# Patient Record
Sex: FEMALE | Employment: FULL TIME | URBAN - METROPOLITAN AREA
[De-identification: names, ages, dates, MRNs, and addresses within clinical notes are randomized per-mention and may not be internally consistent; named-entity substitution may affect disease eponyms.]

---

## 2021-12-22 ENCOUNTER — HOSPITAL ENCOUNTER (OUTPATIENT)
Dept: PHYSICAL THERAPY | Age: 21
Discharge: HOME | End: 2021-12-22
Payer: MEDICAID

## 2021-12-22 PROCEDURE — 97162 PT EVAL MOD COMPLEX 30 MIN: CPT

## 2021-12-22 NOTE — PROGRESS NOTES
[facility]  [address] [place], [address] [place]  Ph [phone]  Fx [phone]    Plan of Care/ Statement of Necessity for Physical Therapy Services    Patient name: Pro Start of Care: 2021   Referral source: Sabrina Russell MD : 2000    Medical Diagnosis: Stress incontinence (female) (male) [N39.3]   Onset Date:18    Treatment Diagnosis: stress incontinence                                      ICD-10: N39.3   Prior Hospitalization: see medical history Provider#: [id number]   Medications: Verified on Patient summary List    Comorbidities: 4 pregnancies/ 1 vaginal, 2 abortions, and currently due 22, asthma, UTI   Prior Level of Function: active lifestyle, no urinary incontinence      The Plan of Care and following information is based on the information from the initial evaluation. Assessment/ key information: 4 pregnancies/ 1 vaginal, 2 abortions, and currently due 22, asthma, UTI    Evaluation Complexity History MEDIUM  Complexity : 1-2 comorbidities / personal factors will impact the outcome/ POC ; Examination HIGH Complexity : 4+ Standardized tests and measures addressing body structure, function, activity limitation and / or participation in recreation  ;Presentation MEDIUM Complexity : Evolving with changing characteristics  ; Clinical Decision Making MEDIUM Complexity : FOTO score of 26-74 : FOTO score = an established functional score where 100 = no disability  Overall Complexity Rating: MEDIUM    Problem List: decrease strength, decrease ADL/ functional abilitiies and other urinary incontinence   Treatment Plan may include any combination of the following: Therapeutic exercise, Therapeutic activities, Neuromuscular re-education, Physical agent/modality, Gait/balance training, Manual therapy, Patient education, Self Care training, Functional mobility training and Home safety training  Vasopnuematic compression justification:  Per bilateral girth measures taken and listed above the edema is considered significant and having an impact on the patient's strength and balance  Patient / Family readiness to learn indicated by: asking questions, trying to perform skills and interest  Persons(s) to be included in education: patient (P)  Barriers to Learning/Limitations: None  Measures taken if barriers to learning: none  Patient Goal (s): I don't want to pee on myself anymore\"  Patient Self Reported Health Status: good  Rehabilitation Potential: excellent  Short term goals: To be achieved in 6 weeks:  Patient will demonstrate proper dietary/fluid habits and urge suppression strategies that promote bladder and bowel health to aid in management of urinary urgency and incontinence. Status at eval: Patient consuming limited fluids and unaware of bladder fitness, dietary irritants and urge suppression strategies.      Patient will report improvement in UI complaints evidence by a decrease in pad usage and/or amount of leakage by 50% to improve QOL. Status at eval: Patient does not use pads but does change her clothes 2-3 per day, generally drops to damp (amount of leakage).          Long term goals: To be achieved in 12 weeks:  Patient will report bladder continence 75% of the time with cough/sneeze/laugh and walking to the toilet.   Status at eval: patient stress and urgency incontinence 2-3 times  per day     Patient will demonstrate improvement of current complaints evidenced by a 12 point  improvement in FOTO, Pelvic functional disability index score  Status at Eval: Pelvic functional disability index 50     Patient will demonstrate independence with management tools and exercise program that are beneficial for current condition in order to feel comfortable with Pelvic floor PT D/C and not fear exacerbation of current condition or symptoms returning.    Status at eval : patient fearful of return to exercise and unaware of what activities to avoid to avoid exacerbation of current condition       Frequency / Duration: Patient to be seen 1 times per week for 12 weeks. Patient/ Caregiver education and instruction: Diagnosis, prognosis, self care   [x]  Plan of care has been reviewed with PTA    Certification Period: estefany    Estee Zach, PT 12/22/2021 2:30 PM    ________________________________________________________________________    I certify that the above Therapy Services are being furnished while the patient is under my care. I agree with the treatment plan and certify that this therapy is necessary.     Physician's Signature:_____________________Date:____________TIME:________                                      Bertin Fraga MD      ** Signature, Date and Time must be completed for valid certification **  Please sign and return to [facility]  [address], [address] [place]  Ph [phone]  Fx [phone]

## 2021-12-22 NOTE — PROGRESS NOTES
Physical Therapy Evaluation        Patient Name: Iliana Blum  Date:2021  : 2000  [x]  Patient  Verified  Payor: BLUE CROSS MEDICAID / Plan: [facility] HEALTHKEEPERS PLUS / Product Type: Managed Care Medicaid /    In time:116  Out time:156  Total Treatment Time (min): 40  Visit #: 1 of 12    Medicare/BCBS Only   Total Timed Codes (min):  0 1:1 Treatment Time:  40       Treatment Area: Stress incontinence (female) (male) [N39.3]    SUBJECTIVE  Pain Level (0-10 scale): 0/10  []constant []intermittent []improving []worsening []no change since onset    Any medication changes, allergies to medications, adverse drug reactions, diagnosis change, or new procedure performed?: [x] No    [] Yes (see summary sheet for update)  Subjective functional status/changes:     PLOF: active lifestyle   Limitations to PLOF: \"pees all the time\"   Mechanism of Injury:  2018  Current symptoms/Complaints: leaks with sneeze, cough, laugh, lifting, transfers, urgency  Previous Treatment/Compliance: none  PMHx/Surgical Hx: 4 pregnancies/ 1 vaginal, 2 abortions, and currently due 22, asthma, UTI  Work Hx: work from home  Living Situation: lives with boyfriend and daughter age 1, apartment 3rd floor no elevator  Pt Goals: I don't want to pee on myself anymore\"  Barriers: []pain []financial []time []transportation []other  Motivation: very  Substance use: []Alcohol []Tobacco []other:   Cognition: A & O x 4    Other:    Current urinary complaint  leaks with activity (stress induced)  leaks with urgency  urinary frequency  urinary urgency  sensation of incomplete bladder emptying with every void  difficulty with initiation of urine flow: less than 50% of the time  stress incontinence  urge incontinence    Bladder complaint longevity:  3 - 5 years    Bladder symptom progression:  worsened    Frequency of UI: 2 times per day    Pad use:  none, chooses not to wear anything but changes clothes    Pad wetness when changed:  drop or two to damp    Daytime urinary frequency:  Every 2 hour(s) during the day    Nocturia:  2-3x/ night    Patient has failed previous pelvic floor muscle training? [] Yes    [x] No    Bowel function:  Regular BM, 5-7 per week  Stool Type ([place]): 3  Toileting position/modifications: no    Fecal Incontinence present?  No    Pain with intercourse:  Initial and deep penetration    Sex from behind is most painful    Pain complaint:  none    Pain scale:  N/A    Pain description:  NA    Diet: pt reports having a good diet  Fluid intake:    Fluid  Amount per day   Water   32 to 48 ounces   Caffeine  no   Alcohol  no             Physical Exam Objective Findings:    Gait:  [x] Normal     [] Abnormal:    Active Movements: [] N/A   [] Too acute   [] Other:  ROM % AROM Comments:pain, area   Forward flexion 40-60 WNL    Extension 20-30 WNL    SideBend right 20-30 20    SideBend left 20-30 15    Rotation right 5-10 WNL    Rotation left 5-10 WNL        Neuro Screen [x] WNL  Myotome/Dermatome/Reflexes:  Comments:    Dural Mobility:  SLR Supine: [] R    [] L    [] +    [x] -  @ (degrees):   Slump Test: [] R    [] L    [] +    [x] -  @ (degrees):       Strength   L(0-5) R (0-5) N/T   Hip Flexion (L1,2) 4 4 []   Knee Extension (L3,4) 5 5 []   Ankle Dorsiflexion (L4) 5 5 []   Great Toe Extension (L5) 5 5 []   Ankle Plantarflexion (S1) 5 5 []   Knee Flexion (S1,2) 5 5 []   Abdominals   [x]   Paraspinals   [x]   Back Rotators   [x]   Gluteus Raghu 5 5 []   Hip Abduction 3+ 3+ []         Special Tests  Lumbar:                Lumbar Distraction:   [] Pos  [x] Neg      [place]:                Compression: [] +    [x] -     Gapping:  [] +    [x] -          Hip:  Cally:  [] R    [] L    [] +    [x] -     Piriformis: [] R    [] L    [] +    [x] -           40 min [x]Eval                  []Re-Eval                 With   [] TE   [] TA   [] neuro   [] other: Patient Education: [x] Review HEP    [] Progressed/Changed HEP based on:   [] positioning   [] body mechanics   [] transfers   [] heat/ice application    [] other:           Pain Level (0-10 scale) post treatment: 0/10    ASSESSMENT/Changes in Function: Patient is a 24year old female presenting to Physical Therapy with c/o stress urinary incontinence which is limiting ability function at previous level. Patient has  pain with intercourse both initial and deep penetration. Patient presents with decreased strength, coordination, and endurance of the  of postural stabilizers. Patient is pregant with her 2nd child (due in April of 2022) Patient presents with limited understanding of bladder and bowel anatomy/function, dietary irritants, and urge suppression. I feel patient would benefit from skilled therapeutic intervention to optimize highest functional level possible. Patient will continue to benefit from skilled PT services to modify and progress therapeutic interventions, address functional mobility deficits, address ROM deficits, address strength deficits, analyze and address soft tissue restrictions, analyze and cue movement patterns, analyze and modify body mechanics/ergonomics, assess and modify postural abnormalities and instruct in home and community integration to attain remaining goals. [x]  See Plan of Care  []  See progress note/recertification  []  See Discharge Summary         Progress towards goals / Updated goals:  Short term goals: To be achieved in 6 weeks:  Patient will demonstrate proper dietary/fluid habits and urge suppression strategies that promote bladder and bowel health to aid in management of urinary urgency and incontinence. Status at eval: Patient consuming limited fluids and unaware of bladder fitness, dietary irritants and urge suppression strategies. Patient will report improvement in UI complaints evidence by a decrease in pad usage and/or amount of leakage by 50% to improve QOL.   Status at eval: Patient does not use pads but does change her clothes 2-3 per day, generally drops to damp (amount of leakage). Long term goals: To be achieved in 12 weeks:  Patient will report bladder continence 75% of the time with cough/sneeze/laugh and walking to the toilet. Status at eval: patient stress and urgency incontinence 2-3 times  per day    Patient will demonstrate improvement of current complaints evidenced by a 12 point  improvement in FOTO, Pelvic functional disability index score  Status at Eval: Pelvic functional disability index 50    Patient will demonstrate independence with management tools and exercise program that are beneficial for current condition in order to feel comfortable with Pelvic floor PT D/C and not fear exacerbation of current condition or symptoms returning.    Status at eval : patient fearful of return to exercise and unaware of what activities to avoid to avoid exacerbation of current condition    PLAN  []  Upgrade activities as tolerated     [x]  Continue plan of care  []  Update interventions per flow sheet       []  Discharge due to:_  []  Other:_ Core strengthening, MFR     Hubert Wick, PT 12/22/2021  12:20 PM

## 2021-12-28 ENCOUNTER — HOSPITAL ENCOUNTER (OUTPATIENT)
Dept: PHYSICAL THERAPY | Age: 21
Discharge: HOME | End: 2021-12-28
Payer: MEDICAID

## 2021-12-28 PROCEDURE — 97530 THERAPEUTIC ACTIVITIES: CPT

## 2021-12-28 PROCEDURE — 97110 THERAPEUTIC EXERCISES: CPT

## 2021-12-28 PROCEDURE — 97112 NEUROMUSCULAR REEDUCATION: CPT

## 2021-12-28 NOTE — PROGRESS NOTES
PT DAILY TREATMENT NOTE    Patient Name: Pro  Date:2021  : 2000  [x]  Patient  Verified  Payor: BLUE CROSS MEDICAID / Plan: [facility] HEALTHKEEPERS PLUS / Product Type: Managed Care Medicaid /    In time:1700  Out time:1740  Total Treatment Time (min): 40  Total Timed Codes (min): 40  1:1 Treatment Time ([facility] only): 40   Visit #: 2 of 10    Treatment Area: Stress incontinence (female) (male) [N39.3]    SUBJECTIVE  Pain Level (0-10 scale): 0/10  Any medication changes, allergies to medications, adverse drug reactions, diagnosis change, or new procedure performed?: [x] No    [] Yes (see summary sheet for update)  Subjective functional status/changes:   [] No changes reported  Patient reports: compliance with current HEP. Patient states she has been trying not to rush to the toilet and \"sometimes it works, sometimes it doesn't\" but feels like she is not sure she can do a kegel. OBJECTIVE    10 min Therapeutic Exercise:  [x] See flow sheet :   Rationale: Increase pelvic floor muscle strength, Improve quality of pelvic floor contractions and Inhibit abnormal muscle activity in order to Increase urinary continence and Decrease urinary urgency. 20 min Therapeutic Activity:  [x]  See flow sheet :bladder irritants, urge suppression, dietary irritants review  Set kegel dotty in session 5/5/10, 2/4/10 3 x/day   Rationale: Increase pelvic floor muscle strength and Improve quality of pelvic floor contractions in order to Increase urinary continence, Decrease urinary urgency and Increase ability to delay urination.        10 min Neuromuscular Re-education:  [x]  See flow sheet :VC for PF contraction long and shorts in sitting  TC for proper belly breathing, poor coordination initially, improved in s/l and then able to perform in sitting with hand at belly/chest   Rationale: Increase pelvic floor muscle strength, Decrease resting tone of the pelvic floor, Increase tissue extensibility of the pelvic floor muscles and Inhibit abnormal muscle activity in order to Increase urinary continence, Decrease urinary urgency, Increase ability to delay urination and Improve ability to perform ADLs. With   [x] TE   [x] TA   [x] neuro   [] other: Patient Education: [x] Review HEP    [] Progressed/Changed HEP based on:   [] positioning   [] body mechanics   [] transfers   [] heat/ice application    [] other:        Pain Level (0-10 scale) post treatment: 0/10    ASSESSMENT/Changes in Function: Patient tolerated today's session well with no c/o pain. Patient demonstrated understanding of today's instruction, education, and recommendations. Patient is progressing appropriately towards goals. Pt has difficulty with diaphragmatic breathing, will likely benefit from review. Pt will benefit from visual external PF assessment and biofeedback in future sessions in addition to core strengthening     Patient will continue to benefit from skilled PT services to modify and progress therapeutic interventions, address functional mobility deficits, address ROM deficits, address strength deficits, analyze and address soft tissue restrictions, analyze and cue movement patterns, analyze and modify body mechanics/ergonomics and assess and modify postural abnormalities to attain remaining goals. [x]  See Plan of Care  []  See progress note/recertification  []  See Discharge Summary         Progress towards goals / Updated goals:  Short term goals: To be achieved in 6 weeks:  Patient will demonstrate proper dietary/fluid habits and urge suppression strategies that promote bladder and bowel health to aid in management of urinary urgency and incontinence. Status at eval: Patient consuming limited fluids and unaware of bladder fitness, dietary irritants and urge suppression strategies.    12/28/2021 reivewed bladder fitness and urge suppression today; set dotty for kegel HEP     Patient will report improvement in UI complaints evidence by a decrease in pad usage and/or amount of leakage by 50% to improve QOL. Status at eval: Patient does not use pads but does change her clothes 2-3 per day, generally drops to damp (amount of leakage).          Long term goals: To be achieved in 12 weeks:  Patient will report bladder continence 75% of the time with cough/sneeze/laugh and walking to the toilet.   Status at eval: patient stress and urgency incontinence 2-3 times  per day     Patient will demonstrate improvement of current complaints evidenced by a 12 point  improvement in FOTO, Pelvic functional disability index score  Status at Eval: Pelvic functional disability index 50     Patient will demonstrate independence with management tools and exercise program that are beneficial for current condition in order to feel comfortable with Pelvic floor PT D/C and not fear exacerbation of current condition or symptoms returning.    Status at eval : patient fearful of return to exercise and unaware of what activities to avoid to avoid exacerbation of current condition       PLAN  []  Upgrade activities as tolerated     [x]  Continue plan of care  [x]  Update interventions per flow sheet       []  Discharge due to:_  []  Other:_  Core strengthening, MFR , PF strength/assessment visual; biofeedback, f/u diaphragmatic breathing; functional lifting    Tari Villatoro, PT 12/28/2021  4:58 PM    Future Appointments   Date Time Provider Bev [place]   12/28/2021  5:00 PM Jose Dacosta, PT [facility]   1/5/2022 11:00 AM [facility]   1/14/2022  9:30 AM [facility]   1/20/2022  9:30 AM [facility]   1/26/2022  9:30 AM [facility]

## 2022-01-05 ENCOUNTER — HOSPITAL ENCOUNTER (OUTPATIENT)
Dept: PHYSICAL THERAPY | Age: 22
Discharge: HOME | End: 2022-01-05
Payer: MEDICAID

## 2022-01-05 PROCEDURE — 97110 THERAPEUTIC EXERCISES: CPT

## 2022-01-05 PROCEDURE — 97535 SELF CARE MNGMENT TRAINING: CPT

## 2022-01-05 PROCEDURE — 97112 NEUROMUSCULAR REEDUCATION: CPT

## 2022-01-05 NOTE — PROGRESS NOTES
PT DAILY TREATMENT NOTE    Patient Name: Pro  Date:2022  : 2000  [x]  Patient  Verified  Payor: BLUE CROSS MEDICAID / Plan: [facility] HEALTHKEEPERS PLUS / Product Type: Managed Care Medicaid /    In time:11:03 AM  Out time:11:46 AM  Total Treatment Time (min): 43  Total Timed Codes (min): 43  1:1 Treatment Time ( [address] only): 37   Visit #: 3 of 10    Treatment Area: Stress incontinence (female) (male) [N39.3]    SUBJECTIVE  Pain Level (0-10 scale): 0/10  Any medication changes, allergies to medications, adverse drug reactions, diagnosis change, or new procedure performed?: [x] No    [] Yes (see summary sheet for update)  Subjective functional status/changes:   [] No changes reported  Patient reports: fair compliance with current HEP. OBJECTIVE    10 min Therapeutic Exercise:  [x] See flow sheet :   Rationale: Increase pelvic floor muscle strength, Improve quality of pelvic floor contractions, Decrease resting tone of the pelvic floor, Increase tissue extensibility of the pelvic floor muscles, Increase core strength, Inhibit abnormal muscle activity and Improve lumbosacral and coccygeal mobility in order to Increase urinary continence, Decrease urinary urgency, Increase ability to delay urination, Decrease frequency of urination, Increase fecal continence, Decrease bowel urgency, Improve ability to engage in sexual intercourse and Improve ability to perform ADLs. 18 min Self Care: Thorough review and handout provided on the following:   -review of the knack with emphasis on pre-activation of the pelvic floor    -review of long holds, cues to avoid breathe holding   Rationale:  Increase awareness and understanding of current condition to improve patients ability to independently and effectively attain goals and progress towards long term management of current condition.       10 min Neuromuscular Re-education:  [x]  See flow sheet :  Core activity with dynadisc as per flowsheet Rationale: Increase pelvic floor muscle strength, Improve quality of pelvic floor contractions, Decrease resting tone of the pelvic floor, Increase tissue extensibility of the pelvic floor muscles, Increase core strength, Inhibit abnormal muscle activity and Improve lumbosacral and coccygeal mobility in order to Increase urinary continence, Decrease urinary urgency, Increase ability to delay urination, Decrease frequency of urination, Decrease nocturia, Decrease bowel urgency, Improve ability to engage in sexual intercourse and Improve ability to perform ADLs. 5 min Manual Therapy: In right/left sidelying: STM/DTM to bilateral QLs, upper glutes, piriformis   Rationale: decrease pain, increase ROM and increase tissue extensibility in order to Increase urinary continence, Decrease urinary urgency, Increase ability to delay urination, Decrease frequency of urination, Decrease nocturia, Increase fecal continence, Decrease bowel urgency, Improve ability to engage in sexual intercourse and Improve ability to perform ADLs. With   [] TE   [] TA   [] neuro   [] other: Patient Education: [x] Review HEP    [] Progressed/Changed HEP based on:   [] positioning   [] body mechanics   [] transfers   [] heat/ice application    [] other:      Other Objective/Functional Measures:      Pain Level (0-10 scale) post treatment: 0/10    ASSESSMENT/Changes in Function: Patient tolerated today's session well with no c/o pain. Patient demonstrated understanding of today's instruction, education, and recommendations. Patient is progressing appropriately towards goals. Patient continues to report leakage with cough/sneeze/laugh--we reviewed the [facility] with emphasis on pre-activation. Patient does well with core strengthening activities today with no adverse effect. Patient will benefit from biofeedback and external PF assessment at next visit.       Patient will continue to benefit from skilled PT services to modify and progress therapeutic interventions, address functional mobility deficits, address ROM deficits, address strength deficits, analyze and address soft tissue restrictions, analyze and cue movement patterns, analyze and modify body mechanics/ergonomics, assess and modify postural abnormalities and instruct in home and community integration to attain remaining goals. [x]  See Plan of Care  []  See progress note/recertification  []  See Discharge Summary         Progress towards goals / Updated goals:  Short term goals: To be achieved in 6 weeks:  Patient will demonstrate proper dietary/fluid habits and urge suppression strategies that promote bladder and bowel health to aid in management of urinary urgency and incontinence. Status at eval: Patient consuming limited fluids and unaware of bladder fitness, dietary irritants and urge suppression strategies. 12/28/2021 reivewed bladder fitness and urge suppression today; set dotty for kegel HEP     Patient will report improvement in UI complaints evidence by a decrease in pad usage and/or amount of leakage by 50% to improve QOL. Status at eval: Patient does not use pads but does change her clothes 2-3 per day, generally drops to damp (amount of leakage).          Long term goals:  To be achieved in 12 weeks:  Patient will report bladder continence 75% of the time with cough/sneeze/laugh and walking to the toilet.   Status at eval: patient stress and urgency incontinence 2-3 times  per day  1/5/22: slow progression, patient reports 75% of the time she leaks with cough/sneeze/laugh     Patient will demonstrate improvement of current complaints evidenced by a 12 point  improvement in FOTO, Pelvic functional disability index score  Status at Eval: Pelvic functional disability index 50     Patient will demonstrate independence with management tools and exercise program that are beneficial for current condition in order to feel comfortable with Pelvic floor PT D/C and not fear exacerbation of current condition or symptoms returning. Status at eval : patient fearful of return to exercise and unaware of what activities to avoid to avoid exacerbation of current condition        PLAN  []? Upgrade activities as tolerated     [x]? Continue plan of care  [x]? Update interventions per flow sheet       []? Discharge due to:_  []?   Other:_  Core strengthening, MFR , PF strength/assessment visual; biofeedback, f/u diaphragmatic breathing; functional lifting     Dot Nieto 1/5/2022  11:07 AM    Future Appointments   Date Time Provider Bev Shaw   1/14/2022  9:30 AM [facility]   1/20/2022  9:30 AM [facility]   1/26/2022  9:30 AM [facility]

## 2022-01-14 ENCOUNTER — HOSPITAL ENCOUNTER (OUTPATIENT)
Dept: PHYSICAL THERAPY | Age: 22
Discharge: HOME | End: 2022-01-14
Payer: MEDICAID

## 2022-01-14 PROCEDURE — 97110 THERAPEUTIC EXERCISES: CPT

## 2022-01-14 PROCEDURE — 97535 SELF CARE MNGMENT TRAINING: CPT

## 2022-01-14 PROCEDURE — 97112 NEUROMUSCULAR REEDUCATION: CPT

## 2022-01-14 NOTE — PROGRESS NOTES
PT DAILY TREATMENT NOTE    Patient Name: Pro  Date:2022  : 2000  [x]  Patient  Verified  Payor: BLUE CROSS MEDICAID / Plan: [facility] HEALTHKEEPERS PLUS / Product Type: Managed Care Medicaid /    In time:9:30 AM  Out time:10:11 AM  Total Treatment Time (min): 41  Total Timed Codes (min): 41  1:1 Treatment Time ([facility] only): 41   Visit #: 4 of 10    Treatment Area: Stress incontinence (female) (male) [N39.3]    SUBJECTIVE  Pain Level (0-10 scale): 0/10  Any medication changes, allergies to medications, adverse drug reactions, diagnosis change, or new procedure performed?: [x] No    [] Yes (see summary sheet for update)  Subjective functional status/changes:   [] No changes reported  Patient reports: good compliance with current HEP. Patient reports that she feels like her [place] are not that strong. OBJECTIVE    10 min Therapeutic Exercise:  [x] See flow sheet :  Pelvic floor muscle contractions, baseline, and elongation reps---observed externally by clinician. Rationale: Increase pelvic floor muscle strength, Improve quality of pelvic floor contractions, Decrease resting tone of the pelvic floor, Increase tissue extensibility of the pelvic floor muscles, Increase core strength, Inhibit abnormal muscle activity and Improve lumbosacral and coccygeal mobility in order to Increase ability to delay urination, Decrease frequency of urination, Increase fecal continence, Decrease bowel urgency, Improve frequency and ease of bowel movements and Improve ability to perform ADLs. 10 min Self Care: Thorough review and handout provided on the following:     -encouraged patient to purchase swiss ball for home use and HEP, reviewed appropriate dimension. Rationale:  Increase awareness and understanding of current condition to improve patients ability to independently and effectively attain goals and progress towards long term management of current condition.         21 min Neuromuscular Re-education:  [x]  See flow sheet :  Biofeedback expectations and implications were reviewed with patient prior to intervention,   Performed sEMG with perianal electrodes as follows:    Position, initial resting tone Work/Rest/Reps Comments   Supine   5/10/10  2/4/10 Patient has great difficulty maintaining PFMC for full 5 second hold with steep incline noted. Sitting   5/10/10  2/4/10 Patient continues to present with difficulty maintaining full 5 second hold. Neuromuscular Re-education was provided with visual, verbal and tactile cueing throughout intervention  Results and implications for treatment and HEP were reviewed in detail with patient during and following today's intervention. Rationale: Increase pelvic floor muscle strength, Improve quality of pelvic floor contractions, Decrease resting tone of the pelvic floor, Increase tissue extensibility of the pelvic floor muscles, Increase core strength, Inhibit abnormal muscle activity and Improve lumbosacral and coccygeal mobility in order to Increase urinary continence, Decrease urinary urgency, Increase ability to delay urination, Decrease frequency of urination, Increase fecal continence, Decrease bowel urgency and Improve ability to undergo a gynecological exam.                With   [] TE   [] TA   [] neuro   [] other: Patient Education: [x] Review HEP    [] Progressed/Changed HEP based on:   [] positioning   [] body mechanics   [] transfers   [] heat/ice application    [] other:      Other Objective/Functional Measures:      Pain Level (0-10 scale) post treatment: 0/10    ASSESSMENT/Changes in Function: Patient tolerated today's session well with no c/o pain. Patient demonstrated understanding of today's instruction, education, and recommendations. Patient is progressing appropriately towards goals.  Patient presents with decreased pelvic floor ROM with PFMC and with elongation as noted with external observation (minimal anal and vaginal movement noted). Patient has great difficulty maintaining PFMC >2 seconds as noted with biofeedback. With maximal cueing, patient is able to improve quality of PFMC. Patient also with delayed return to rest tone with quick holds. Patient will benefit from continued core and glute strengthening with re-visit of biofeedback in upcoming visits. Patient will continue to benefit from skilled PT services to modify and progress therapeutic interventions, address functional mobility deficits, address ROM deficits, address strength deficits, analyze and address soft tissue restrictions, analyze and cue movement patterns, analyze and modify body mechanics/ergonomics, assess and modify postural abnormalities and instruct in home and community integration to attain remaining goals. [x]  See Plan of Care  []  See progress note/recertification  []  See Discharge Summary         Progress towards goals / Updated goals:  Short term goals: To be achieved in 6 weeks:  Patient will demonstrate proper dietary/fluid habits and urge suppression strategies that promote bladder and bowel health to aid in management of urinary urgency and incontinence. Status at eval: Patient consuming limited fluids and unaware of bladder fitness, dietary irritants and urge suppression strategies.   12/28/2021 reivewed bladder fitness and urge suppression today; set dotty for kegel HEP     Patient will report improvement in UI complaints evidence by a decrease in pad usage and/or amount of leakage by 50% to improve QOL. Status at eval: Patient does not use pads but does change her clothes 2-3 per day, generally drops to damp (amount of leakage). 1/14/22: slow progress, patient reports that she is still having dampness at end of day           Long term goals:  To be achieved in 12 weeks:  Patient will report bladder continence 75% of the time with cough/sneeze/laugh and walking to the toilet.   Status at eval: patient stress and urgency incontinence 2-3 times  per day  1/5/22: slow progression, patient reports 75% of the time she leaks with cough/sneeze/laugh     Patient will demonstrate improvement of current complaints evidenced by a 12 point  improvement in FOTO, Pelvic functional disability index score  Status at Eval: Pelvic functional disability index 50     Patient will demonstrate independence with management tools and exercise program that are beneficial for current condition in order to feel comfortable with Pelvic floor PT D/C and not fear exacerbation of current condition or symptoms returning. Status at eval : patient fearful of return to exercise and unaware of what activities to avoid to avoid exacerbation of current condition        PLAN  []??  Upgrade activities as tolerated     [x]? ?  Continue plan of care  [x]? ?  Update interventions per flow sheet       []? ?  Discharge due to:_  []??  Other:_  Core strengthening, MFR , PF strength/assessment visual; biofeedback, f/u diaphragmatic breathing; functional lifting   Goldie Perry 1/14/2022  8:03 AM    Future Appointments   Date Time Provider Bev Shaw   1/14/2022  9:30 AM [facility]   1/20/2022  9:30 AM [facility]   1/26/2022  9:30 AM [facility]

## 2022-01-20 ENCOUNTER — APPOINTMENT (OUTPATIENT)
Dept: PHYSICAL THERAPY | Age: 22
End: 2022-01-20
Payer: MEDICAID

## 2022-01-26 ENCOUNTER — HOSPITAL ENCOUNTER (OUTPATIENT)
Dept: PHYSICAL THERAPY | Age: 22
Discharge: HOME | End: 2022-01-26
Payer: MEDICAID

## 2022-01-26 PROCEDURE — 97110 THERAPEUTIC EXERCISES: CPT

## 2022-01-26 PROCEDURE — 97112 NEUROMUSCULAR REEDUCATION: CPT

## 2022-01-26 PROCEDURE — 97530 THERAPEUTIC ACTIVITIES: CPT

## 2022-01-26 NOTE — PROGRESS NOTES
[facility]  [address] [facility]  [facility], [address]  Ph [phone]  Fx [phone]    Pelvic Floor Progress Note  Patient name: Pro Start of Care: 2021   Referral source: Mikey Tovar MD : 2000                Medical Diagnosis: Stress incontinence (female) (male) [N39.3]    Onset Date:18                Treatment Diagnosis: stress incontinence                                      ICD-10: N39.3   Prior Hospitalization: see medical history Provider#: [id number]   Medications: Verified on Patient summary List    Comorbidities: 4 pregnancies/ 1 vaginal, 2 abortions, and currently due 22, asthma, UTI   Prior Level of Function: active lifestyle, no urinary incontinence  Visits from Start of Care: 5    Missed Visits: 1    Updated Goals/Measure of Progress: To be achieved in 8 weeks:  Short term goals: To be achieved in 6 weeks:  Patient will demonstrate proper dietary/fluid habits and urge suppression strategies that promote bladder and bowel health to aid in management of urinary urgency and incontinence. Status at eval: Patient consuming limited fluids and unaware of bladder fitness, dietary irritants and urge suppression strategies.   2021 reivewed bladder fitness and urge suppression today; set dotty for kegel HEP  22: progressing, patient reports appropriate water fluid intake and improvement in utilizing urge suppression strategy     Patient will report improvement in UI complaints evidence by a decrease in pad usage and/or amount of leakage by 50% to improve QOL. Status at eval: Patient does not use pads but does change her clothes 2-3 per day, generally drops to damp (amount of leakage).    22: slow progress, patient reports that she is still having dampness at end of day  22: MET, patient denies pad usage           Long term goals:  To be achieved in 12 weeks:  Patient will report bladder continence 75% of the time with cough/sneeze/laugh and walking to the toilet.   Status at eval: patient stress and urgency incontinence 2-3 times  per day  1/5/22: slow progression, patient reports 75% of the time she leaks with cough/sneeze/laugh  1/26/22: PROGRESSING, patient denies leakage with cough/sneeze/laugh and reports 65% bladder continence     Patient will demonstrate improvement of current complaints evidenced by a 12 point  improvement in FOTO, Pelvic functional disability index score  Status at Eval: Pelvic functional disability index 50  1/26/22: nearly met, improvement my 11 points     Patient will demonstrate independence with management tools and exercise program that are beneficial for current condition in order to feel comfortable with Pelvic floor PT D/C and not fear exacerbation of current condition or symptoms returning. Status at eval : patient fearful of return to exercise and unaware of what activities to avoid to avoid exacerbation of current condition  1/26/22: progressing, patient is being compliant with HEP    Key Functional Changes:            Excellent Good         Limited           None  [x] Increase Pelvic MM strength       []  [x]  []  []  [] Decrease Pelvic MM hypertonus     []  []  []  []  [x] Decrease Incontinence Episodes    []  [x]  []  []   [x] Improve Voiding Habits        []  [x]  []  []   [x] Decreased Urgency        []  [x]  []  []  [] Decrease Pelvic Pain        []  []  []  []      ASSESSMENT/RECOMMENDATIONS:  Patient is making good progress towards goals. Patient reports much improved awareness of [address] post biofeedback at last visit. Patient also reporting 65% improvement in bladder continence with improvement in FOTO score as well. Patient will benefit from continued, skilled pelvic floor PT 1x8 weeks with progression towards functional lifting, standing activity per patient tolerance, and continued PF strengthening.      [x]Continue therapy per initial plan/protocol at a frequency of  1 x per week for 8 weeks  []Continue therapy with the following recommended changes:_____________________      _____________________________________________________________________  []Discontinue therapy progressing towards or have reached established goals  []Discontinue therapy due to lack of appreciable progress towards goals  []Discontinue therapy due to lack of attendance or compliance  []Await Physician's recommendations/decisions regarding therapy  []Other:________________________________________________________________    Thank you for this referral.   Rosangela Cardozo 1/26/2022 10:15 AM  NOTE TO PHYSICIAN:  [address] TO [facility]: [phone]  If you are unable to process this request in 24 hours please contact our office: [phone]      ? I have read the above report and request that my patient continue as recommended. ? I have read the above report and request that my patient continue therapy with the following changes/special instructions:___________________________________________________________  ? I have read the above report and request that my patient be discharged from therapy.

## 2022-01-26 NOTE — PROGRESS NOTES
PT DAILY TREATMENT NOTE    Patient Name: Pro  Date:2022  : 2000  [x]  Patient  Verified  Payor: [employer] / Plan: [facility] [employer] PLUS / Product Type: Managed Care Medicaid /    In time:9:32 AM  Out time:10:12 AM  Total Treatment Time (min): 40  Total Timed Codes (min): 40  1:1 Treatment Time ( only): 40   Visit #: 5 of 10    Treatment Area: Stress incontinence (female) (male) [N39.3]    SUBJECTIVE  Pain Level (0-10 scale): 0/10  Any medication changes, allergies to medications, adverse drug reactions, diagnosis change, or new procedure performed?: [x] No    [] Yes (see summary sheet for update)  Subjective functional status/changes:   [] No changes reported  Patient reports: good compliance with current HEP. \"My kegels are getting a lot better. \"    OBJECTIVE    10 min Therapeutic Exercise:  [x] See flow sheet :   Rationale: Increase pelvic floor muscle strength, Improve quality of pelvic floor contractions, Decrease resting tone of the pelvic floor, Increase tissue extensibility of the pelvic floor muscles, Increase core strength and Improve lumbosacral and coccygeal mobility in order to Increase urinary continence, Decrease urinary urgency, Increase ability to delay urination, Decrease frequency of urination, Decrease nocturia, Decrease bowel urgency, Improve frequency and ease of bowel movements, Improve ability to undergo a gynecological exam and Improve ability to perform ADLs. 10 min Self Care: Thorough review and handout provided on the following:   -educated patient on safe, appropriate prenatal exercise. Also held discussion regarding possible prenatal yoga class per MD clearance*   Rationale:  Increase awareness and understanding of current condition to improve patients ability to independently and effectively attain goals and progress towards long term management of current condition.      10 min Therapeutic Activity:  [x]  See flow sheet :   Rationale: Increase pelvic floor muscle strength, Improve quality of pelvic floor contractions, Decrease resting tone of the pelvic floor, Increase tissue extensibility of the pelvic floor muscles, Increase core strength and Improve lumbosacral and coccygeal mobility in order to Increase urinary continence, Decrease urinary urgency, Increase ability to delay urination, Decrease frequency of urination, Decrease nocturia, Increase fecal continence, Decrease bowel urgency, Improve ability to engage in sexual intercourse and Improve ability to perform ADLs. 10 min Neuromuscular Re-education:  [x]  See flow sheet :   Rationale: Increase pelvic floor muscle strength, Improve quality of pelvic floor contractions, Decrease resting tone of the pelvic floor, Increase tissue extensibility of the pelvic floor muscles, Increase core strength and Improve lumbosacral and coccygeal mobility in order to Increase urinary continence, Decrease urinary urgency, Increase ability to delay urination, Decrease frequency of urination, Increase fecal continence, Decrease bowel urgency and Improve ability to perform ADLs. With   [] TE   [] TA   [] neuro   [] other: Patient Education: [x] Review HEP    [] Progressed/Changed HEP based on:   [] positioning   [] body mechanics   [] transfers   [] heat/ice application    [] other:      Other Objective/Functional Measures:      Pain Level (0-10 scale) post treatment: 0/10    ASSESSMENT/Changes in Function:   Patient is making good progress towards goals. Patient reports much improved awareness of [address] post biofeedback at last visit. Patient also reporting 65% improvement in bladder continence with improvement in FOTO score as well. Patient will benefit from continued, skilled pelvic floor PT 1x8 weeks with progression towards functional lifting, standing activity per patient tolerance, and continued PF strengthening.      Patient will continue to benefit from skilled PT services to modify and progress therapeutic interventions, address functional mobility deficits, address ROM deficits, address strength deficits, analyze and address soft tissue restrictions, analyze and cue movement patterns, analyze and modify body mechanics/ergonomics, assess and modify postural abnormalities, address imbalance/dizziness and instruct in home and community integration to attain remaining goals. [x]  See Plan of Care  [x]  See progress note/recertification  []  See Discharge Summary         Progress towards goals / Updated goals:  Short term goals: To be achieved in 6 weeks:  Patient will demonstrate proper dietary/fluid habits and urge suppression strategies that promote bladder and bowel health to aid in management of urinary urgency and incontinence. Status at eval: Patient consuming limited fluids and unaware of bladder fitness, dietary irritants and urge suppression strategies.   12/28/2021 reivewed bladder fitness and urge suppression today; set dotty for kegel HEP  1/26/22: progressing, patient reports appropriate water fluid intake and improvement in utilizing urge suppression strategy     Patient will report improvement in UI complaints evidence by a decrease in pad usage and/or amount of leakage by 50% to improve QOL. Status at eval: Patient does not use pads but does change her clothes 2-3 per day, generally drops to damp (amount of leakage). 1/14/22: slow progress, patient reports that she is still having dampness at end of day  1/26/22: MET, patient denies pad usage           Long term goals:  To be achieved in 12 weeks:  Patient will report bladder continence 75% of the time with cough/sneeze/laugh and walking to the toilet.   Status at eval: patient stress and urgency incontinence 2-3 times  per day  1/5/22: slow progression, patient reports 75% of the time she leaks with cough/sneeze/laugh  1/26/22: PROGRESSING, patient denies leakage with cough/sneeze/laugh and reports 65% bladder continence     Patient will demonstrate improvement of current complaints evidenced by a 12 point  improvement in FOTO, Pelvic functional disability index score  Status at Eval: Pelvic functional disability index 50  1/26/22: nearly met, improvement my 11 points     Patient will demonstrate independence with management tools and exercise program that are beneficial for current condition in order to feel comfortable with Pelvic floor PT D/C and not fear exacerbation of current condition or symptoms returning. Status at eval : patient fearful of return to exercise and unaware of what activities to avoid to avoid exacerbation of current condition  1/26/22: progressing, patient is being compliant with HEP        PLAN  []???  Upgrade activities as tolerated     [x]? ??  Continue plan of care  [x]? ??  Update interventions per flow sheet       []???  Discharge due to:_  []???  Other:_  Core strengthening, MFR , PF strength/assessment visual; biofeedback, f/u diaphragmatic breathing; functional lifting     Ivette Quiles 1/26/2022  7:53 AM    Future Appointments   Date Time Provider Bev Shaw   1/26/2022  9:30 AM [facility]

## 2022-02-03 ENCOUNTER — TELEPHONE (OUTPATIENT)
Dept: PHYSICAL THERAPY | Age: 22
End: 2022-02-03

## 2022-02-03 ENCOUNTER — HOSPITAL ENCOUNTER (OUTPATIENT)
Dept: PHYSICAL THERAPY | Age: 22
End: 2022-02-03

## 2022-02-10 ENCOUNTER — TELEPHONE (OUTPATIENT)
Dept: PHYSICAL THERAPY | Age: 22
End: 2022-02-10

## 2022-02-17 ENCOUNTER — TELEPHONE (OUTPATIENT)
Dept: PHYSICAL THERAPY | Age: 22
End: 2022-02-17

## 2022-02-24 ENCOUNTER — TELEPHONE (OUTPATIENT)
Dept: PHYSICAL THERAPY | Age: 22
End: 2022-02-24

## 2022-02-28 ENCOUNTER — TELEPHONE (OUTPATIENT)
Dept: PHYSICAL THERAPY | Age: 22
End: 2022-02-28

## 2022-02-28 NOTE — PROGRESS NOTES
[facility]  [address] [place], [address]  Ph [phone]  Fx [phone]    Physical Therapy Discharge Summary    Patient name: Liana Frost Start of Care: 2021   Referral source: Crow Weber MD : 2000                Medical Diagnosis: Stress incontinence (female) (male) [N39.3]    Onset Date:18                Treatment Diagnosis: stress incontinence                                      ICD-10: N39.3   Prior Hospitalization: see medical history Provider#: [id number]   Medications: Verified on Patient summary List    Comorbidities: 4 pregnancies/ 1 vaginal, 2 abortions, and currently due 22, asthma, UTI   Prior Level of Function: active lifestyle, no urinary incontinence    Visits from Start of Care: 5    Missed Visits: 3    Reporting Period : 2021 to 2022    Goals/Measure of Progress:  Short term goals:   Patient will demonstrate proper dietary/fluid habits and urge suppression strategies that promote bladder and bowel health to aid in management of urinary urgency and incontinence. Status at eval: Patient consuming limited fluids and unaware of bladder fitness, dietary irritants and urge suppression strategies.   2021 reivewed bladder fitness and urge suppression today; set dotty for kegel HEP  22: progressing, patient reports appropriate water fluid intake and improvement in utilizing urge suppression strategies. 2022 Not known Patient has requested to be discharged. D/C Goal     Short term goals: To be achieved in 6 weeks:  Patient will demonstrate proper dietary/fluid habits and urge suppression strategies that promote bladder and bowel health to aid in management of urinary urgency and incontinence.   Status at eval: Patient consuming limited fluids and unaware of bladder fitness, dietary irritants and urge suppression strategies.   2021 reivewed bladder fitness and urge suppression today; set dotty for kegel HEP  1/26/22: progressing, patient reports appropriate water fluid intake and improvement in utilizing urge suppression strategy  2/28/2022 Not known Patient has requested to be discharged. D/C Goal         Patient will report improvement in UI complaints evidence by a decrease in pad usage and/or amount of leakage by 50% to improve QOL. Status at eval: Patient does not use pads but does change her clothes 2-3 per day, generally drops to damp (amount of leakage).    1/14/22: slow progress, patient reports that she is still having dampness at end of day  1/26/22: MET, patient denies pad usage           Long term goals:   Patient will report bladder continence 75% of the time with cough/sneeze/laugh and walking to the toilet.   Status at eval: patient stress and urgency incontinence 2-3 times  per day  1/5/22: slow progression, patient reports 75% of the time she leaks with cough/sneeze/laugh  1/26/22: PROGRESSING, patient denies leakage with cough/sneeze/laugh and reports 65% bladder continence   2/28/2022 Not known Patient has requested to be discharged. D/C Goal      Patient will demonstrate improvement of current complaints evidenced by a 12 point  improvement in FOTO, Pelvic functional disability index score  Status at Eval: Pelvic functional disability index 50  1/26/22: nearly met, improvement my 11 points  2/28/2022 Not known Patient has requested to be discharged. D/C Goal         Patient will demonstrate independence with management tools and exercise program that are beneficial for current condition in order to feel comfortable with Pelvic floor PT D/C and not fear exacerbation of current condition or symptoms returning. Status at eval : patient fearful of return to exercise and unaware of what activities to avoid to avoid exacerbation of current condition  1/26/22: progressing, patient is being compliant with HEP  2/28/2022 Not known Patient has requested to be discharged.   D/C Goal      Assessment/ Summary of Care:  Patient is a 25year old female who was treated five times  by pelvic floor physical therapy for stress incontinence. She was progressing well with therapy but has requested to discharge from therapy stating that she is too busy to continue. Patient will be discharged.     RECOMMENDATIONS:  [x]Discontinue therapy: []Patient has reached or is progressing toward set goals      [x]Patient is non-compliant or has abdicated      []Due to lack of appreciable progress towards set goals    Senait Scruggs, PT 2/28/2022 1:07 PM